# Patient Record
Sex: MALE | Race: WHITE | NOT HISPANIC OR LATINO | URBAN - METROPOLITAN AREA
[De-identification: names, ages, dates, MRNs, and addresses within clinical notes are randomized per-mention and may not be internally consistent; named-entity substitution may affect disease eponyms.]

---

## 2017-06-19 ENCOUNTER — OFFICE (OUTPATIENT)
Dept: URBAN - METROPOLITAN AREA CLINIC 75 | Facility: CLINIC | Age: 27
End: 2017-06-19

## 2017-06-19 VITALS
HEART RATE: 51 BPM | WEIGHT: 211 LBS | DIASTOLIC BLOOD PRESSURE: 70 MMHG | SYSTOLIC BLOOD PRESSURE: 134 MMHG | HEIGHT: 75 IN

## 2017-06-19 DIAGNOSIS — K51.50 LEFT SIDED COLITIS WITHOUT COMPLICATIONS: ICD-10-CM

## 2017-06-19 PROCEDURE — 99214 OFFICE O/P EST MOD 30 MIN: CPT | Performed by: INTERNAL MEDICINE

## 2017-11-10 ENCOUNTER — OFFICE (OUTPATIENT)
Dept: URBAN - METROPOLITAN AREA CLINIC 75 | Facility: CLINIC | Age: 27
End: 2017-11-10

## 2017-11-10 VITALS
DIASTOLIC BLOOD PRESSURE: 78 MMHG | SYSTOLIC BLOOD PRESSURE: 142 MMHG | WEIGHT: 213 LBS | HEIGHT: 75 IN | HEART RATE: 68 BPM

## 2017-11-10 DIAGNOSIS — K51.50 LEFT SIDED COLITIS WITHOUT COMPLICATIONS: ICD-10-CM

## 2017-11-10 DIAGNOSIS — R19.7 DIARRHEA, UNSPECIFIED: ICD-10-CM

## 2017-11-10 PROCEDURE — 99214 OFFICE O/P EST MOD 30 MIN: CPT | Performed by: INTERNAL MEDICINE

## 2017-11-10 RX ORDER — PREDNISONE 10 MG/1
TABLET ORAL
Qty: 100 | Refills: 1 | Status: ACTIVE

## 2017-11-10 RX ORDER — MESALAMINE 1000 MG/1
SUPPOSITORY RECTAL
Qty: 60 | Refills: 6 | Status: ACTIVE

## 2018-09-11 ENCOUNTER — APPOINTMENT (OUTPATIENT)
Dept: LAB | Age: 28
End: 2018-09-11
Attending: FAMILY MEDICINE
Payer: COMMERCIAL

## 2018-09-11 ENCOUNTER — OFFICE VISIT (OUTPATIENT)
Dept: FAMILY MEDICINE CLINIC | Facility: CLINIC | Age: 28
End: 2018-09-11
Payer: COMMERCIAL

## 2018-09-11 VITALS
WEIGHT: 214 LBS | DIASTOLIC BLOOD PRESSURE: 75 MMHG | TEMPERATURE: 99 F | HEART RATE: 76 BPM | HEIGHT: 75 IN | BODY MASS INDEX: 26.61 KG/M2 | RESPIRATION RATE: 18 BRPM | SYSTOLIC BLOOD PRESSURE: 124 MMHG

## 2018-09-11 DIAGNOSIS — L65.9 HAIR LOSS: ICD-10-CM

## 2018-09-11 DIAGNOSIS — L65.9 HAIR LOSS: Primary | ICD-10-CM

## 2018-09-11 DIAGNOSIS — R61 EXCESSIVE SWEATING: ICD-10-CM

## 2018-09-11 LAB
T3 SERPL-MCNC: 1.2 NG/ML (ref 0.87–1.78)
T4 SERPL-MCNC: 10.1 MCG/DL (ref 6.1–12.2)
TSH SERPL-ACNC: 1.47 UIU/ML (ref 0.45–5.33)

## 2018-09-11 PROCEDURE — 36415 COLL VENOUS BLD VENIPUNCTURE: CPT

## 2018-09-11 PROCEDURE — 84436 ASSAY OF TOTAL THYROXINE: CPT

## 2018-09-11 PROCEDURE — 84443 ASSAY THYROID STIM HORMONE: CPT

## 2018-09-11 PROCEDURE — 99202 OFFICE O/P NEW SF 15 MIN: CPT | Performed by: FAMILY MEDICINE

## 2018-09-11 PROCEDURE — 99212 OFFICE O/P EST SF 10 MIN: CPT | Performed by: FAMILY MEDICINE

## 2018-09-11 PROCEDURE — 84480 ASSAY TRIIODOTHYRONINE (T3): CPT

## 2018-09-11 RX ORDER — MESALAMINE 1000 MG/1
SUPPOSITORY RECTAL
Refills: 4 | COMMUNITY
Start: 2018-08-22 | End: 2018-11-07

## 2018-09-11 RX ORDER — PREDNISONE 10 MG/1
TABLET ORAL
Refills: 0 | COMMUNITY
Start: 2018-08-15 | End: 2018-11-07

## 2018-09-11 NOTE — PROGRESS NOTES
HPI:    Patient ID: Braden Browne is a 29year old male. Pt presents with concerns about his thyroid. Pt has had increased sweating, hair loss over the last few months. No hx of thyroid problems.    Pt has hx of ulcerative colitis and is seeing a GI an GI for his ulcerative colitis.       Orders Placed This Encounter      TSH [E]      Triiodothyronine (T3) Total [E]      Thyroxine (T4) Total [E]      Meds This Visit:  Requested Prescriptions      No prescriptions requested or ordered in this encounter

## 2018-11-07 ENCOUNTER — OFFICE VISIT (OUTPATIENT)
Dept: GASTROENTEROLOGY | Facility: CLINIC | Age: 28
End: 2018-11-07
Payer: COMMERCIAL

## 2018-11-07 ENCOUNTER — TELEPHONE (OUTPATIENT)
Dept: GASTROENTEROLOGY | Facility: CLINIC | Age: 28
End: 2018-11-07

## 2018-11-07 VITALS
DIASTOLIC BLOOD PRESSURE: 75 MMHG | WEIGHT: 220 LBS | SYSTOLIC BLOOD PRESSURE: 123 MMHG | HEIGHT: 75 IN | HEART RATE: 66 BPM | BODY MASS INDEX: 27.35 KG/M2

## 2018-11-07 DIAGNOSIS — K51.20 ULCERATIVE PROCTITIS WITHOUT COMPLICATION (HCC): Primary | ICD-10-CM

## 2018-11-07 PROCEDURE — 99243 OFF/OP CNSLTJ NEW/EST LOW 30: CPT | Performed by: INTERNAL MEDICINE

## 2018-11-07 PROCEDURE — 99212 OFFICE O/P EST SF 10 MIN: CPT | Performed by: INTERNAL MEDICINE

## 2018-11-07 NOTE — PATIENT INSTRUCTIONS
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

## 2018-11-07 NOTE — TELEPHONE ENCOUNTER
I called Dr Veto Cisneros office and spoke to [facility] and informed her that I was looking for colonoscopy records from 8/2013, she stated that Dr Tracy Vargas did not perform that colonoscopy and therefore could not release those records.    However she did give the

## 2018-11-07 NOTE — PROGRESS NOTES
Burgess Panda is a 29year old male. HPI:   Patient presents with:  GI Bleeding (gastrointestinal)    The patient is a 44-year-old male who was diagnosed with ulcerative proctitis 5 years ago.   He reports he has been under good control with Canasa sup file.    Allergies:    Sulfa Antibiotics       UNKNOWN      ROS:   The patient denies any chest pain or shortness of breath,  No neurologic or dermatologic symptoms.      PHYSICAL EXAM:   Blood pressure 123/75, pulse 66, height 6' 3\" (1.905 m), weight 220

## 2018-12-28 ENCOUNTER — TELEPHONE (OUTPATIENT)
Dept: GASTROENTEROLOGY | Facility: CLINIC | Age: 28
End: 2018-12-28

## 2018-12-28 NOTE — TELEPHONE ENCOUNTER
Overdue labs letter mailed to pt.     Labs: Sed Rate, Immunoglobulin, Tissue Transglutaminase, C-Reactive Protein, Comp Metabolic Panel, CBC

## 2018-12-31 NOTE — TELEPHONE ENCOUNTER
Pt contacted and reminded he was to follow up in 2-3 months per 's Nov OV. Accepted appt with Dr Jalen Almazan Feb 14, instructed to arrive by 2pm and given directions to [facility]. He will also get overdue labs done.

## 2019-11-07 ENCOUNTER — TELEPHONE (OUTPATIENT)
Dept: GASTROENTEROLOGY | Facility: CLINIC | Age: 29
End: 2019-11-07

## 2019-11-07 DIAGNOSIS — K51.219 ULCERATIVE PROCTITIS WITH COMPLICATION (HCC): Primary | ICD-10-CM

## 2019-11-07 RX ORDER — MESALAMINE 1000 MG/1
1000 SUPPOSITORY RECTAL NIGHTLY
Qty: 30 SUPPOSITORY | Refills: 2 | Status: SHIPPED | OUTPATIENT
Start: 2019-11-07 | End: 2020-01-21

## 2019-11-07 NOTE — TELEPHONE ENCOUNTER
Dr. Paz Payne,     Pt had pending lab orders from 18, they were not done. Pt now requesting for them to be renewed as they  today. I pended these labs for your review and signature. Pt is also asking a refill on canasa suppository.   Per Melba Cedeño

## 2019-11-07 NOTE — TELEPHONE ENCOUNTER
Patient requesting new orders for labs expiring today. Patient scheduled appointment on 1/15/19, please call at:[phone],thanks.

## 2019-11-07 NOTE — TELEPHONE ENCOUNTER
Patient requesting script for rx:Canasa or generic Mesalamini Suppostory 1000 MG, one per day prescribed by previously gi in [facility].  Please call at:  Patient has 2 weeks lefts and needs prior to appointment schedule 1/15/19 with , please call at:2

## 2019-11-07 NOTE — TELEPHONE ENCOUNTER
Tamiko Gill 1 hour ago (12:01 PM)         Patient requesting script for rx:Canasa or generic Mesalamini Suppostory 1000 MG, one per day prescribed by previously gi in [facility].  Please call at:  Patient has 2 weeks lefts and needs prior to appointment sche

## 2019-11-08 NOTE — TELEPHONE ENCOUNTER
Patient contacted and informed him about the refill and the labs being renewed. Requested that I send over the lab orders to his home address since he's going to have them down outside the hospital due to coverage issues.      Address confirmed and mailed

## 2020-01-21 RX ORDER — MESALAMINE 1000 MG/1
SUPPOSITORY RECTAL
Qty: 90 SUPPOSITORY | Refills: 0 | Status: SHIPPED | OUTPATIENT
Start: 2020-01-21 | End: 2020-02-20

## 2020-01-26 LAB
ABSOLUTE BASOPHILS: 11 CELLS/UL (ref 0–200)
ABSOLUTE EOSINOPHILS: 29 CELLS/UL (ref 15–500)
ABSOLUTE LYMPHOCYTES: 1199 CELLS/UL (ref 850–3900)
ABSOLUTE MONOCYTES: 346 CELLS/UL (ref 200–950)
ABSOLUTE NEUTROPHILS: 2016 CELLS/UL (ref 1500–7800)
ALBUMIN/GLOBULIN RATIO: 2 (CALC) (ref 1–2.5)
ALBUMIN: 4.9 G/DL (ref 3.6–5.1)
ALKALINE PHOSPHATASE: 41 U/L (ref 40–115)
ALT: 22 U/L (ref 9–46)
AST: 21 U/L (ref 10–40)
BASOPHILS: 0.3 %
BILIRUBIN, TOTAL: 0.5 MG/DL (ref 0.2–1.2)
BUN: 13 MG/DL (ref 7–25)
C-REACTIVE PROTEIN: 1.2 MG/L
CALCIUM: 9.9 MG/DL (ref 8.6–10.3)
CARBON DIOXIDE: 26 MMOL/L (ref 20–32)
CHLORIDE: 101 MMOL/L (ref 98–110)
CREATININE: 1.03 MG/DL (ref 0.6–1.35)
EGFR IF AFRICN AM: 113 ML/MIN/1.73M2
EGFR IF NONAFRICN AM: 98 ML/MIN/1.73M2
EOSINOPHILS: 0.8 %
GLOBULIN: 2.4 G/DL (CALC) (ref 1.9–3.7)
GLUCOSE: 87 MG/DL (ref 65–99)
HEMATOCRIT: 40 % (ref 38.5–50)
HEMOGLOBIN: 14.3 G/DL (ref 13.2–17.1)
IMMUNOGLOBULIN A: 185 MG/DL (ref 47–310)
LYMPHOCYTES: 33.3 %
MCH: 29.7 PG (ref 27–33)
MCHC: 35.8 G/DL (ref 32–36)
MCV: 83 FL (ref 80–100)
MONOCYTES: 9.6 %
MPV: 10.8 FL (ref 7.5–12.5)
NEUTROPHILS: 56 %
PLATELET COUNT: 230 THOUSAND/UL (ref 140–400)
POTASSIUM: 3.9 MMOL/L (ref 3.5–5.3)
PROTEIN, TOTAL: 7.3 G/DL (ref 6.1–8.1)
RDW: 12.8 % (ref 11–15)
RED BLOOD CELL COUNT: 4.82 MILLION/UL (ref 4.2–5.8)
SED RATE BY MODIFIED$WESTERGREN: 6 MM/H
SODIUM: 138 MMOL/L (ref 135–146)
TISSUE TRANSGLUTAMINASE$ANTIBODY, IGA: 1 U/ML
WHITE BLOOD CELL COUNT: 3.6 THOUSAND/UL (ref 3.8–10.8)

## 2020-02-20 ENCOUNTER — TELEPHONE (OUTPATIENT)
Dept: GASTROENTEROLOGY | Facility: CLINIC | Age: 30
End: 2020-02-20

## 2020-02-20 ENCOUNTER — OFFICE VISIT (OUTPATIENT)
Dept: GASTROENTEROLOGY | Facility: CLINIC | Age: 30
End: 2020-02-20
Payer: COMMERCIAL

## 2020-02-20 VITALS
SYSTOLIC BLOOD PRESSURE: 124 MMHG | WEIGHT: 214.19 LBS | HEIGHT: 75 IN | DIASTOLIC BLOOD PRESSURE: 77 MMHG | BODY MASS INDEX: 26.63 KG/M2 | HEART RATE: 60 BPM

## 2020-02-20 DIAGNOSIS — K51.219 ULCERATIVE PROCTITIS WITH COMPLICATION (HCC): Primary | ICD-10-CM

## 2020-02-20 PROCEDURE — 99212 OFFICE O/P EST SF 10 MIN: CPT | Performed by: INTERNAL MEDICINE

## 2020-02-20 NOTE — PATIENT INSTRUCTIONS
Ulcerative proctitis  - continue on canasa suppository   - lab work every 6 months  - colonoscopy + colyte prep and MAC  - see me annually

## 2020-02-20 NOTE — TELEPHONE ENCOUNTER
Scheduled for:  Colonoscopy 97895  Provider Name:  Dr Willy Giron  Date:  05/15/2020  Location:  [facility]  Sedation:  MAC  Time:  0730 (pt is aware to arrive at 0630)  Prep:  Colyte  Meds/Allergies Reconciled?:  Physician reviewed  Diagnosis with codes:  Ulcerative P

## 2020-02-21 RX ORDER — MESALAMINE 1000 MG/1
1000 SUPPOSITORY RECTAL NIGHTLY
Qty: 90 SUPPOSITORY | Refills: 3 | Status: SHIPPED | OUTPATIENT
Start: 2020-02-21 | End: 2020-04-03

## 2020-03-02 NOTE — PROGRESS NOTES
Hoy Fleischer is a 27year old male. HPI:   Patient presents with: Follow - Up: no new complaints, needs refill on medication      The patient is a 80-year-old male who has a history of ulcerative proctitis.   He has been doing well on therapy and whi Canasa suppositories which he has been taking on a fairly regular basis. We discussed the risks including risk of progression of disease to involve more the colon such as ulcerative colitis.   The potential for more aggressive therapy was also discussed wi

## 2020-04-02 NOTE — TELEPHONE ENCOUNTER
Current Outpatient Medications   Medication Sig Dispense Refill   • mesalamine (CANASA) 1000 MG Rectal Suppos Place 1 suppository (1,000 mg total) rectally nightly.  90 suppository 3       Requesting cheaper alternative

## 2020-04-02 NOTE — TELEPHONE ENCOUNTER
Dr. Chandu Farfan    Is there an cheaper alternative to mesalamine 1000mg suppository that we could prescribe for patient?       Please advise    Thank you

## 2020-04-03 RX ORDER — MESALAMINE 1000 MG/1
1000 SUPPOSITORY RECTAL NIGHTLY
Qty: 90 SUPPOSITORY | Refills: 3 | Status: SHIPPED | OUTPATIENT
Start: 2020-04-03 | End: 2021-03-04

## 2020-04-03 NOTE — TELEPHONE ENCOUNTER
Patient aware that prescription entered as dispense as written so that he could use his discount card on CANASA.     Thank you

## 2020-04-03 NOTE — TELEPHONE ENCOUNTER
In order for patient to use discount card, he has to get prescription for the brand name [place] only and not the generic. There is only one way to enter this in our electronic system. I spoke with pharmacist Charles Johnson at [facility] in [employer] a [address].   Per aníbal

## 2020-04-03 NOTE — TELEPHONE ENCOUNTER
Patient indicates he has a discount card, but in order for him to use he needs script for rx:Canasa. Patient indicates the generic would be more expensive. Please call at:[phone],thanks.     Current Outpatient Medications:   •  mesalamine (CANASA) 1000

## 2020-05-14 ENCOUNTER — TELEPHONE (OUTPATIENT)
Dept: GASTROENTEROLOGY | Facility: CLINIC | Age: 30
End: 2020-05-14

## 2020-05-14 NOTE — TELEPHONE ENCOUNTER
I spoke with this patient to reschedule his procedure but he decided to hold off for now since he is feeling much better. He will CB if his issues return.

## 2020-11-02 ENCOUNTER — APPOINTMENT (OUTPATIENT)
Dept: LAB | Age: 30
End: 2020-11-02
Attending: PHYSICIAN ASSISTANT
Payer: COMMERCIAL

## 2020-11-02 ENCOUNTER — TELEMEDICINE (OUTPATIENT)
Dept: FAMILY MEDICINE CLINIC | Facility: CLINIC | Age: 30
End: 2020-11-02

## 2020-11-02 VITALS — TEMPERATURE: 100 F

## 2020-11-02 DIAGNOSIS — Z20.822 SUSPECTED 2019 NOVEL CORONAVIRUS INFECTION: ICD-10-CM

## 2020-11-02 DIAGNOSIS — Z20.822 SUSPECTED 2019 NOVEL CORONAVIRUS INFECTION: Primary | ICD-10-CM

## 2020-11-02 PROCEDURE — 99213 OFFICE O/P EST LOW 20 MIN: CPT | Performed by: PHYSICIAN ASSISTANT

## 2020-11-02 NOTE — PROGRESS NOTES
Please note that the following visit was completed using two-way, real-time interactive audio and/or video communication.   This has been done in good cindy to provide continuity of care in the best interest of the provider-patient relationship, due to the Antibiotics       UNKNOWN       Current Outpatient Medications:   •  CANASA 1000 MG Rectal Suppos, Place 1 suppository (1,000 mg total) rectally nightly., Disp: 90 suppository, Rfl: 3    History reviewed. No pertinent past medical history.       PHYSICAL EX SUMAN  11/2/2020  10:30 AM    #[id number]

## 2020-11-05 ENCOUNTER — TELEPHONE (OUTPATIENT)
Dept: FAMILY MEDICINE CLINIC | Facility: CLINIC | Age: 30
End: 2020-11-05

## 2020-11-05 NOTE — TELEPHONE ENCOUNTER
Patient stated that he was symptomatic but still awaiting his Covid test however wondering since his wife is positive should they be quarantining from each other. Educated patient on CDC guidelines and advised patient that if he is positive that they will both have to quarantine away from the general public. If symptoms worsen with shortness of breath or uncontrollable fevers to seek ER. Patient agreed.

## 2021-03-04 RX ORDER — MESALAMINE 1000 MG/1
1000 SUPPOSITORY RECTAL NIGHTLY
Qty: 90 SUPPOSITORY | Refills: 3 | Status: SHIPPED | OUTPATIENT
Start: 2021-03-04

## 2021-03-04 NOTE — TELEPHONE ENCOUNTER
Requested Prescriptions     Pending Prescriptions Disp Refills   • CANASA 1000 MG Rectal Suppos [Pharmacy Med Name: CANASA 1,000 MG SUPPOSITORY] 90 suppository 3     Sig: PLACE 1 SUPPOSITORY (1,000 MG TOTAL) RECTALLY NIGHTLY.      LOV  2/20/2020  LR  4/03/2
normal...

## 2022-03-09 ENCOUNTER — TELEPHONE (OUTPATIENT)
Dept: GASTROENTEROLOGY | Facility: CLINIC | Age: 32
End: 2022-03-09

## 2022-03-09 RX ORDER — MESALAMINE 1000 MG/1
1000 SUPPOSITORY RECTAL NIGHTLY
Qty: 90 SUPPOSITORY | Refills: 0 | Status: SHIPPED | OUTPATIENT
Start: 2022-03-09 | End: 2022-03-30

## 2022-03-10 NOTE — TELEPHONE ENCOUNTER
Patient read Intell[employer] message about setting up appointment for future refills:  Last read by Tenzin Cagle at 7:46 AM on 3/10/2022.

## 2022-03-11 NOTE — TELEPHONE ENCOUNTER
Fax received for [facility] that Canasa suppositories has been denied because it is not on their list of preferred drugs. I called [facility].  They told me he should still have a month supply left. He has two plans per [address] and it goes through for a $137 copay which he has been paying/picking up with.

## 2022-03-16 NOTE — TELEPHONE ENCOUNTER
Dr. Frank Zhou    Patient's Canasa suppositories have been denied. Would you be willing to do a peer to peer? Information on how to proceed in below message.     Thank you

## 2022-03-16 NOTE — TELEPHONE ENCOUNTER
Dean/Jerad states that this office can do a peer to peer for this medication if necessary. Please call Dr. Samantha Dodd at [phone] to complete.     Ref # is [id number]

## 2022-03-18 NOTE — TELEPHONE ENCOUNTER
Attempted to contact Dr. Rudy Fountain voicemail said he is out of the office until Wednesday, March 23.     RN to make sure the pt has enough medication - will attempt to contact the doctor for approval when he is back next week

## 2022-03-18 NOTE — TELEPHONE ENCOUNTER
Patient contacted. He still has a box left which is a 30 day supply. I told him I would update him once peer to peer done.

## 2022-03-30 RX ORDER — MESALAMINE 1000 MG/1
SUPPOSITORY RECTAL
Qty: 90 SUPPOSITORY | Refills: 3 | Status: SHIPPED | OUTPATIENT
Start: 2022-03-30

## 2022-04-01 NOTE — TELEPHONE ENCOUNTER
Dr. Ayah Hamilton    Patient called his insurance and then called us to follow up. Are you able to assist with peer to peer so he can fill the [address] by calling Dr. Kim Shankar at [phone] Ref # [id number]? I let him know that Dr. Janae Brian was out of office until 3/23 then you were out of office this week.     Thank you

## 2022-04-07 ENCOUNTER — PATIENT MESSAGE (OUTPATIENT)
Dept: GASTROENTEROLOGY | Facility: CLINIC | Age: 32
End: 2022-04-07

## 2022-04-07 NOTE — TELEPHONE ENCOUNTER
From: Vipul Harris  To: Daryl Crooks. Paige Meza MD  Sent: 4/7/2022 11:20 AM CDT  Subject: Appeal with [employer]    I've phoned [employer] to request an expedited appeal of their denial of my Mesalamine 1000mg Suppository. They require more information from your office. If I hear back from them I will follow up with any additional requests. Could you please continue to pursue this issue on your end? I currently have a 2-week supply of medication and am not sure how to proceed if this case is not resolved before I run out of suppositories.     Thanks,    Danisha Thomas

## 2022-04-07 NOTE — TELEPHONE ENCOUNTER
From: Ehsan Chin  To: Ade Shepard. Alex Cole MD  Sent: 4/7/2022 11:29 AM CDT  Subject: [employer] Appeal    If it improves my odds of coverage I'm open to the generic Mesalamine 1000mg suppository. I'm not comfortable with the oral tablet / systemic treatment if it is not required. I'm also not keen on the enema. I'd rather go unmedicated and see what happens rather than deal with that every night.

## 2022-04-07 NOTE — TELEPHONE ENCOUNTER
Dr. Jeniffer Hsu    Please see below message from patient.     I already routed TE today about appeal and if you wanted to write a letter to support case     Thank you

## 2022-04-08 NOTE — TELEPHONE ENCOUNTER
See TE dated 3/9/2022 for further documentation. I already faxed letter and clinicals to support need for medication to [facility] as expedited. Await response which should be within 72 hours.

## 2022-07-15 ENCOUNTER — TELEPHONE (OUTPATIENT)
Dept: GASTROENTEROLOGY | Facility: CLINIC | Age: 32
End: 2022-07-15

## 2022-07-15 RX ORDER — MESALAMINE 1000 MG/1
1000 SUPPOSITORY RECTAL NIGHTLY
Qty: 90 SUPPOSITORY | Refills: 3 | Status: SHIPPED | OUTPATIENT
Start: 2022-07-15

## 2022-07-15 NOTE — TELEPHONE ENCOUNTER
See TE 2/20/2020 and TE 5/14/2020     RN's  I contacted patient and about Rx refill and follow-up appointment.  Patient agreed to schedule CLN procedure before his scheduled follow-up appointment with Dr Dora Fair on 10/19/2022 @ [address] [place] [address] 1 @ 10:30 am  Please route schedulers if appropiate    Cancelled for:  Colonoscopy 34731  Provider Name:  Dr Dora Fair  Date:  05/15/2020  Location:  [facility]  Sedation:  MAC  Time:  0730 (pt is aware to arrive at 0630)  Prep:  Colyte  Meds/Allergies Reconciled?:  Physician reviewed  Diagnosis with codes:  Ulcerative Proctitis K51.219

## 2022-07-15 NOTE — TELEPHONE ENCOUNTER
Dr. Hamilton Fitzgerald (on call provider)    Patient requesting refill of mesalamine suppositories. Not on protocol list for direct RN approval.    Please sign off on refill if appropriate. Has office visit scheduled in October.     Thank you    Your Appointments           Wednesday October 19, 2022 10:30 AM  (Arrive by 10:20 AM)  Follow Up Visit with Izabela Meza MD  [facility], [facility], [address] [place] [address],[address], [place] [facility] [address] [facility]  [phone]

## 2022-07-15 NOTE — TELEPHONE ENCOUNTER
Dr. Mikayla Calle    Patient has an office appointment scheduled for 10/19/2022. Can he schedule his colonoscopy now since we are booking out or does he need to be seen in office first?    If he can schedule now, could you please provide orders?     Thank you

## 2022-07-19 NOTE — TELEPHONE ENCOUNTER
Ok to schedule the colonoscopy directly but he needs to follow up in the office as well.    Dx- ulcerative proctitis  Miralax prep  MAC sedation

## 2022-10-19 ENCOUNTER — OFFICE VISIT (OUTPATIENT)
Dept: GASTROENTEROLOGY | Facility: CLINIC | Age: 32
End: 2022-10-19
Payer: COMMERCIAL

## 2022-10-19 ENCOUNTER — TELEPHONE (OUTPATIENT)
Dept: GASTROENTEROLOGY | Facility: CLINIC | Age: 32
End: 2022-10-19

## 2022-10-19 VITALS
DIASTOLIC BLOOD PRESSURE: 70 MMHG | HEIGHT: 75 IN | BODY MASS INDEX: 27.23 KG/M2 | WEIGHT: 219 LBS | SYSTOLIC BLOOD PRESSURE: 120 MMHG

## 2022-10-19 DIAGNOSIS — K51.20 ULCERATIVE PROCTITIS WITHOUT COMPLICATION (HCC): Primary | ICD-10-CM

## 2022-10-19 PROCEDURE — 3008F BODY MASS INDEX DOCD: CPT | Performed by: INTERNAL MEDICINE

## 2022-10-19 PROCEDURE — 3074F SYST BP LT 130 MM HG: CPT | Performed by: INTERNAL MEDICINE

## 2022-10-19 PROCEDURE — 3078F DIAST BP <80 MM HG: CPT | Performed by: INTERNAL MEDICINE

## 2022-10-19 PROCEDURE — 99213 OFFICE O/P EST LOW 20 MIN: CPT | Performed by: INTERNAL MEDICINE

## 2022-10-19 NOTE — PATIENT INSTRUCTIONS
Ulcerative proctitis/colitis  - continue on canasa   - lab work today   - colonoscopy with Miralax and MAC   - make sure up to date on vaccines.

## 2022-10-19 NOTE — TELEPHONE ENCOUNTER
Patient was given prep sheets for both Golytely/Trilyte/Colyte and Miralax/Gatorade. Dr. Jeniffer Hsu ordered Miralax but patient stated he had the gallon prep before and it worked very well. He wants to talk with his wife who is a pharmacist and will call our office if he would like the prescription Golytely/Trilyte/Colyte. Scheduled for:  Colonoscopy 52724  Provider Name:  Dr. Jeniffer Hsu  Date:  01/06/2023 - per pt request  Location:  [facility]  Sedation:  MAC  Time:  9:45 am, arrival 8:45 am  Prep:  See above  Meds/Allergies Reconciled?: Physician reviewed  Diagnosis with codes:  Ulcerative proctitis K51.20  Was patient informed to call insurance with codes (Y/N):  Yes  Referral sent?:  Referral was sent at the time of electronic surgical scheduling. [facility] or [address] notified?:  I sent an electronic request to Endo Scheduling and received a confirmation today. Medication Orders:  N/A  Misc Orders:  N/A     Further instructions given by staff:  I provided patient with both prep instruction sheets (see above).

## 2022-12-06 NOTE — TELEPHONE ENCOUNTER
Patient is scheduled for CLN on 1/06/2023 @ [address] [place] [address] 1 and was seen in office 10/19/2022.  Closing TE

## 2022-12-30 NOTE — PAT NURSING NOTE
Patient stated him and daughter were having sinus infection symptoms, since procedure is more than 1 week away, instead of canceling per anesthesia recommendation pt advised to get rapid day prior, agreed instead of canceling. covid test scheduled

## 2023-01-05 ENCOUNTER — LAB ENCOUNTER (OUTPATIENT)
Dept: LAB | Facility: HOSPITAL | Age: 33
End: 2023-01-05
Attending: INTERNAL MEDICINE
Payer: COMMERCIAL

## 2023-01-05 DIAGNOSIS — Z01.818 PRE-OP TESTING: ICD-10-CM

## 2023-01-05 LAB — SARS-COV-2 RNA RESP QL NAA+PROBE: NOT DETECTED

## 2023-01-06 ENCOUNTER — HOSPITAL ENCOUNTER (OUTPATIENT)
Age: 33
Setting detail: HOSPITAL OUTPATIENT SURGERY
Discharge: HOME | End: 2023-01-06
Attending: INTERNAL MEDICINE | Admitting: INTERNAL MEDICINE
Payer: COMMERCIAL

## 2023-01-06 ENCOUNTER — ANESTHESIA (OUTPATIENT)
Dept: ENDOSCOPY | Age: 33
End: 2023-01-06
Payer: COMMERCIAL

## 2023-01-06 ENCOUNTER — ANESTHESIA EVENT (OUTPATIENT)
Dept: ENDOSCOPY | Age: 33
End: 2023-01-06
Payer: COMMERCIAL

## 2023-01-06 VITALS
HEIGHT: 75 IN | SYSTOLIC BLOOD PRESSURE: 100 MMHG | OXYGEN SATURATION: 97 % | DIASTOLIC BLOOD PRESSURE: 69 MMHG | TEMPERATURE: 98 F | BODY MASS INDEX: 27.23 KG/M2 | HEART RATE: 67 BPM | RESPIRATION RATE: 13 BRPM | WEIGHT: 219 LBS

## 2023-01-06 DIAGNOSIS — Z01.818 PRE-OP TESTING: Primary | ICD-10-CM

## 2023-01-06 DIAGNOSIS — K51.20 ULCERATIVE PROCTITIS WITHOUT COMPLICATION (HCC): ICD-10-CM

## 2023-01-06 PROCEDURE — 45380 COLONOSCOPY AND BIOPSY: CPT | Performed by: INTERNAL MEDICINE

## 2023-01-06 PROCEDURE — 88305 TISSUE EXAM BY PATHOLOGIST: CPT | Performed by: INTERNAL MEDICINE

## 2023-01-06 PROCEDURE — 45385 COLONOSCOPY W/LESION REMOVAL: CPT | Performed by: INTERNAL MEDICINE

## 2023-01-06 PROCEDURE — 99070 SPECIAL SUPPLIES PHYS/QHP: CPT | Performed by: INTERNAL MEDICINE

## 2023-01-06 RX ORDER — SODIUM CHLORIDE, SODIUM LACTATE, POTASSIUM CHLORIDE, CALCIUM CHLORIDE 600; 310; 30; 20 MG/100ML; MG/100ML; MG/100ML; MG/100ML
INJECTION, SOLUTION INTRAVENOUS CONTINUOUS PRN
Status: DISCONTINUED | OUTPATIENT
Start: 2023-01-06 | End: 2023-01-06 | Stop reason: SURG

## 2023-01-06 RX ADMIN — SODIUM CHLORIDE, SODIUM LACTATE, POTASSIUM CHLORIDE, CALCIUM CHLORIDE: 600; 310; 30; 20 INJECTION, SOLUTION INTRAVENOUS at 09:45:00

## 2023-01-06 RX ADMIN — SODIUM CHLORIDE, SODIUM LACTATE, POTASSIUM CHLORIDE, CALCIUM CHLORIDE: 600; 310; 30; 20 INJECTION, SOLUTION INTRAVENOUS at 09:26:00

## 2023-01-06 NOTE — OPERATIVE REPORT
[facility] Endoscopy Report      Preoperative Diagnosis:  - history of ulcerative proctitis       Postoperative Diagnosis:  - no colitis /proctitis  - colon polyp x1  - early diverticulosis   - small internal hemorrhoids      Procedure:    Colonoscopy      Surgeon:  Ana Murdock M.D. Anesthesia:  MAC     Technique:  After informed consent, the patient was placed in the left lateral recumbent position. Digital rectal examination revealed no palpable intraluminal abnormalities. An Olympus variable stiffness 190 series HD colonoscope was inserted into the rectum and advanced under direct vision by following the lumen to the cecum/TI. The colon was examined upon withdrawal in the left lateral position. The procedures were well tolerated without immediate complication. Findings:  The preparation of the colon was good. The terminal ileum was examined for 4 cm and visually normal.  The ileocecal valve was well preserved. The visualized colonic mucosa from the cecum to the anal verge was normal with an intact vascular pattern. Colon polyp x1 removed from the base of the cecum, this was sessile approximately 4 mm in size and cold snare removed. No bleeding was noted from the polypectomy site and specimens retrieved and sent for analysis. Random samples taken from overall normal-appearing colonic mucosa, no evidence of colitis or proctitis. Biopsies taken from right colon, left colon and rectum and labeled in separate containers. Dimpling of the colon wall in the sigmoid and distal descending colon consistent with early diverticulosis. No diverticulitis. Small internal hemorrhoids noted on retroflexed view.     Estimated blood loss-insignificant  Specimens-see above      Impression:  - no colitis /proctitis  - colon polyp x1  - early diverticulosis   - small internal hemorrhoids    Recommendations:  - Post polypectomy instructions given  - Repeat colonoscopy in 2 years  - High fiber diet for diverticular disease  - Symptomatic treatment of hemorrhoids          Amy Osorio.  Ayah Hamilton MD  1/6/2023  9:48 AM

## 2023-01-06 NOTE — DISCHARGE INSTRUCTIONS
Home Care Instructions for Colonoscopy and/or Gastroscopy with Sedation    Diet:  - Resume your regular diet as tolerated unless otherwise instructed. - Start with light meals to minimize bloating.  - Do not drink alcohol today. Medication:  - If you have questions about resuming your normal medications, please contact your Primary Care Physician. Activities:  - Take it easy today. Do not return to work today. - Do not drive today. - Do not operate any machinery today (including kitchen equipment). Colonoscopy:  - You may notice some rectal \"spotting\" (a little blood on the toilet tissue) for a day or two after the exam. This is normal.  - If you experience any rectal bleeding (not spotting), persistent tenderness or sharp severe abdominal pains, oral temperature over 100 degrees Fahrenheit, light-headedness or dizziness, or any other problems, contact your doctor. **If unable to reach your doctor, please go to the [facility] Emergency Room**    - Your referring physician will receive a full report of your examination.  - If you do not hear from your doctor's office within two weeks of your biopsy, please call them for your results. You may be able to see your laboratory results in [facility] between 4 and 7 business days. In some cases, your physician may not have viewed the results before they are released to [address] If you have questions regarding your results contact the physician who ordered the test/exam by phone or via [address] by choosing \"Ask a Medical Question. \"

## 2023-01-12 ENCOUNTER — TELEPHONE (OUTPATIENT)
Facility: CLINIC | Age: 33
End: 2023-01-12

## 2023-01-12 NOTE — TELEPHONE ENCOUNTER
----- Message from Christelle Skaggs MD sent at 1/9/2023  5:08 PM CST -----  I wanted to get back to you with your colonoscopy results. You had one colon polyp removed which was benign. I would advise a repeat colonoscopy in 2 years to check for new polyps and monitor for colitis. No evidence of active colitis. You do have internal hemorrhoids and diverticulosis. Please call with any questions.

## 2023-01-12 NOTE — TELEPHONE ENCOUNTER
Health Maintenance updated. 2 year colonoscopy recall entered into patient outreach in [facility]. Next colonoscopy will be due 1/6/2025    Result letter generated, released to [address], and mailed to patient's home address.

## 2023-02-27 ENCOUNTER — TELEPHONE (OUTPATIENT)
Facility: CLINIC | Age: 33
End: 2023-02-27

## 2023-02-27 NOTE — TELEPHONE ENCOUNTER
1st reminder letter sent out via mail and [employer] for the following:   COMP METABOLIC PANEL (14) (Order #[id number]) on 10/19/22  CBC WITH DIFFERENTIAL WITH PLATELET (Order #[id number]) on 10/19/22

## 2023-06-28 RX ORDER — MESALAMINE 1000 MG/1
1000 SUPPOSITORY RECTAL NIGHTLY
Qty: 90 SUPPOSITORY | Refills: 3 | Status: SHIPPED | OUTPATIENT
Start: 2023-06-28

## 2023-11-09 ENCOUNTER — OFFICE VISIT (OUTPATIENT)
Dept: FAMILY MEDICINE CLINIC | Facility: CLINIC | Age: 33
End: 2023-11-09
Payer: COMMERCIAL

## 2023-11-09 VITALS
HEART RATE: 60 BPM | TEMPERATURE: 98 F | HEIGHT: 73.3 IN | DIASTOLIC BLOOD PRESSURE: 68 MMHG | WEIGHT: 217 LBS | SYSTOLIC BLOOD PRESSURE: 110 MMHG | BODY MASS INDEX: 28.45 KG/M2 | RESPIRATION RATE: 16 BRPM

## 2023-11-09 DIAGNOSIS — Z87.19 HISTORY OF ULCERATIVE COLITIS: ICD-10-CM

## 2023-11-09 DIAGNOSIS — Z00.00 ROUTINE PHYSICAL EXAMINATION: Primary | ICD-10-CM

## 2023-11-09 PROCEDURE — 3074F SYST BP LT 130 MM HG: CPT | Performed by: FAMILY MEDICINE

## 2023-11-09 PROCEDURE — 3008F BODY MASS INDEX DOCD: CPT | Performed by: FAMILY MEDICINE

## 2023-11-09 PROCEDURE — 99395 PREV VISIT EST AGE 18-39: CPT | Performed by: FAMILY MEDICINE

## 2023-11-09 PROCEDURE — 3078F DIAST BP <80 MM HG: CPT | Performed by: FAMILY MEDICINE

## 2023-11-09 NOTE — PROGRESS NOTES
Subjective:   Patient ID: Arina Anne is a 35year old male. Patient is here for routine physical exam. No acute issues. Patient is requesting testing. Diet and exercise have been fair. Pt has young kids. Past medical history, family history, and social history were reviewed. Past hx of ulcerative colitis and taking medication. Pt is seeing GI and doing well. Had COVID and has had COVID vaccine        History/Other:   Review of Systems   Constitutional: Negative. Negative for fever. HENT: Negative. Eyes: Negative. Respiratory: Negative. Negative for cough and shortness of breath. Cardiovascular: Negative. Negative for chest pain and palpitations. Gastrointestinal: Negative. Negative for abdominal pain and blood in stool. Endocrine: Negative. Genitourinary: Negative. Negative for difficulty urinating and dysuria. Musculoskeletal: Negative. Skin: Negative. Allergic/Immunologic: Negative. Neurological: Negative. Negative for dizziness and headaches. Hematological: Negative. Psychiatric/Behavioral: Negative. Negative for dysphoric mood. The patient is not nervous/anxious. Current Outpatient Medications   Medication Sig Dispense Refill    MESALAMINE 1000 MG Rectal Suppos PLACE 1 SUPPOSITORY (1,000 MG TOTAL) RECTALLY NIGHTLY. 90 suppository 3    Probiotic Product (PROBIOTIC DAILY OR) Take by mouth. [employer] brand       Allergies: Allergies   Allergen Reactions    Sulfa Antibiotics UNKNOWN       Objective:   Physical Exam  Constitutional:       Appearance: Normal appearance. He is well-developed. HENT:      Head: Normocephalic. Right Ear: Tympanic membrane, ear canal and external ear normal.      Left Ear: Tympanic membrane, ear canal and external ear normal.      Nose: Nose normal.      Mouth/Throat:      Mouth: Mucous membranes are moist.      Pharynx: No oropharyngeal exudate or posterior oropharyngeal erythema.    Eyes:      Conjunctiva/sclera: Conjunctivae normal.   Cardiovascular:      Rate and Rhythm: Normal rate and regular rhythm. Pulses: Normal pulses. Heart sounds: Normal heart sounds. Pulmonary:      Effort: Pulmonary effort is normal. No respiratory distress. Breath sounds: Normal breath sounds. No wheezing or rales. Abdominal:      General: Abdomen is flat. There is no distension. Palpations: Abdomen is soft. There is no mass. Tenderness: There is no abdominal tenderness. Musculoskeletal:         General: Normal range of motion. Cervical back: Normal range of motion and neck supple. Skin:     General: Skin is warm. Neurological:      General: No focal deficit present. Mental Status: He is alert and oriented to person, place, and time. Sensory: No sensory deficit. Deep Tendon Reflexes: Reflexes are normal and symmetric. Reflexes normal.   Psychiatric:         Mood and Affect: Mood normal.         Behavior: Behavior normal.         Assessment & Plan:   1. Routine physical examination:  - Exam is unremarkable. Screening tests were discussed, and after discussion, will check lab work as below. Healthy diet, exercise, and weight were discussed. To call if problems and follow up and further management after testing. Routine follow up. Discussed COVID vaccines. 2. History of ulcerative colitis :  - Stable, continue present management and follow up with GI. Orders Placed This Encounter   Procedures    Comp Metabolic Panel (14)    CBC, Platelet;  No Differential    Lipid Panel    Hemoglobin A1C       Meds This Visit:  Requested Prescriptions      No prescriptions requested or ordered in this encounter       Imaging & Referrals:  None

## 2024-06-04 RX ORDER — MESALAMINE 1000 MG/1
1000 SUPPOSITORY RECTAL NIGHTLY
Qty: 90 SUPPOSITORY | Refills: 3 | Status: SHIPPED | OUTPATIENT
Start: 2024-06-04

## 2024-06-04 NOTE — TELEPHONE ENCOUNTER
Refills sent on the suppositories.     RN to have the pt see me in the office within the next 4 - 5 months or so.

## 2024-06-04 NOTE — TELEPHONE ENCOUNTER
Patient called to request 90 day supply of Mesalamine.  Patient needs this sent to [facility].  Please call.          Current Outpatient Medications:     MESALAMINE 1000 MG Rectal Suppos, PLACE 1 SUPPOSITORY (1,000 MG TOTAL) RECTALLY NIGHTLY., Disp: 90 suppository, Rfl: 3

## 2024-06-04 NOTE — TELEPHONE ENCOUNTER
Please advise on refill request below.   Thank you.     LV 10/19/2022    LR 06/28/2023    No future appointment noted in system .

## 2024-09-17 ENCOUNTER — LAB ENCOUNTER (OUTPATIENT)
Dept: LAB | Age: 34
End: 2024-09-17
Attending: FAMILY MEDICINE
Payer: COMMERCIAL

## 2024-09-17 ENCOUNTER — OFFICE VISIT (OUTPATIENT)
Dept: FAMILY MEDICINE CLINIC | Facility: CLINIC | Age: 34
End: 2024-09-17
Payer: COMMERCIAL

## 2024-09-17 VITALS
WEIGHT: 220 LBS | RESPIRATION RATE: 16 BRPM | TEMPERATURE: 98 F | HEIGHT: 74.8 IN | HEART RATE: 72 BPM | SYSTOLIC BLOOD PRESSURE: 126 MMHG | BODY MASS INDEX: 27.64 KG/M2 | DIASTOLIC BLOOD PRESSURE: 72 MMHG

## 2024-09-17 DIAGNOSIS — Z30.09 VASECTOMY EVALUATION: Primary | ICD-10-CM

## 2024-09-17 LAB
ALBUMIN SERPL-MCNC: 5 G/DL (ref 3.2–4.8)
ALBUMIN/GLOB SERPL: 1.6 {RATIO} (ref 1–2)
ALP LIVER SERPL-CCNC: 54 U/L
ALT SERPL-CCNC: 35 U/L
ANION GAP SERPL CALC-SCNC: 8 MMOL/L (ref 0–18)
AST SERPL-CCNC: 22 U/L (ref ?–34)
BILIRUB SERPL-MCNC: 0.6 MG/DL (ref 0.3–1.2)
BUN BLD-MCNC: 15 MG/DL (ref 9–23)
BUN/CREAT SERPL: 14.2 (ref 10–20)
CALCIUM BLD-MCNC: 10.1 MG/DL (ref 8.7–10.4)
CHLORIDE SERPL-SCNC: 104 MMOL/L (ref 98–112)
CHOLEST SERPL-MCNC: 200 MG/DL (ref ?–200)
CO2 SERPL-SCNC: 27 MMOL/L (ref 21–32)
CREAT BLD-MCNC: 1.06 MG/DL
DEPRECATED RDW RBC AUTO: 37.2 FL (ref 35.1–46.3)
EGFRCR SERPLBLD CKD-EPI 2021: 94 ML/MIN/1.73M2 (ref 60–?)
ERYTHROCYTE [DISTWIDTH] IN BLOOD BY AUTOMATED COUNT: 12.5 % (ref 11–15)
EST. AVERAGE GLUCOSE BLD GHB EST-MCNC: 103 MG/DL (ref 68–126)
FASTING PATIENT LIPID ANSWER: NO
FASTING STATUS PATIENT QL REPORTED: NO
GLOBULIN PLAS-MCNC: 3.1 G/DL (ref 2–3.5)
GLUCOSE BLD-MCNC: 96 MG/DL (ref 70–99)
HBA1C MFR BLD: 5.2 % (ref ?–5.7)
HCT VFR BLD AUTO: 44.3 %
HDLC SERPL-MCNC: 61 MG/DL (ref 40–59)
HGB BLD-MCNC: 15.8 G/DL
LDLC SERPL CALC-MCNC: 110 MG/DL (ref ?–100)
MCH RBC QN AUTO: 29.2 PG (ref 26–34)
MCHC RBC AUTO-ENTMCNC: 35.7 G/DL (ref 31–37)
MCV RBC AUTO: 81.9 FL
NONHDLC SERPL-MCNC: 139 MG/DL (ref ?–130)
OSMOLALITY SERPL CALC.SUM OF ELEC: 289 MOSM/KG (ref 275–295)
PLATELET # BLD AUTO: 251 10(3)UL (ref 150–450)
POTASSIUM SERPL-SCNC: 4.6 MMOL/L (ref 3.5–5.1)
PROT SERPL-MCNC: 8.1 G/DL (ref 5.7–8.2)
RBC # BLD AUTO: 5.41 X10(6)UL
SODIUM SERPL-SCNC: 139 MMOL/L (ref 136–145)
TRIGL SERPL-MCNC: 169 MG/DL (ref 30–149)
VLDLC SERPL CALC-MCNC: 29 MG/DL (ref 0–30)
WBC # BLD AUTO: 6.3 X10(3) UL (ref 4–11)

## 2024-09-17 PROCEDURE — 80061 LIPID PANEL: CPT | Performed by: FAMILY MEDICINE

## 2024-09-17 PROCEDURE — 99213 OFFICE O/P EST LOW 20 MIN: CPT | Performed by: FAMILY MEDICINE

## 2024-09-17 PROCEDURE — 83036 HEMOGLOBIN GLYCOSYLATED A1C: CPT | Performed by: FAMILY MEDICINE

## 2024-09-17 PROCEDURE — 85027 COMPLETE CBC AUTOMATED: CPT | Performed by: FAMILY MEDICINE

## 2024-09-17 PROCEDURE — 36415 COLL VENOUS BLD VENIPUNCTURE: CPT | Performed by: FAMILY MEDICINE

## 2024-09-17 PROCEDURE — 80053 COMPREHEN METABOLIC PANEL: CPT | Performed by: FAMILY MEDICINE

## 2024-09-17 NOTE — PROGRESS NOTES
Subjective:   Patient ID: Deonte Esquivel is a 34 year old male.    Pt states has had some questions about hydrocephalus as father was diagnosed with this and has significant deterioration of his condition. No hx of any symptoms a a child. Pt has no sig symptoms.     Pt is interested in doing a vasectomy as he is done with having more children.   Had questions about COVID vaccine also. Pt did get initial series and booster but had sig reaction after 2nd dose. Was wondering about antibody testing. Explained we do not usually do antibody testing as not to helpful in determining immune status for new strains.         History/Other:   Review of Systems  Current Outpatient Medications   Medication Sig Dispense Refill    mesalamine 1000 MG Rectal Suppos Place 1 suppository (1,000 mg total) rectally nightly. 90 suppository 3    Probiotic Product (PROBIOTIC DAILY OR) Take by mouth. Francisco's brand       Allergies:  Allergies   Allergen Reactions    Sulfa Antibiotics UNKNOWN       Objective:   Physical Exam  Constitutional:       Appearance: Normal appearance.   Neurological:      Mental Status: He is alert.   Psychiatric:         Mood and Affect: Mood normal.         Behavior: Behavior normal.         Thought Content: Thought content normal.         Judgment: Judgment normal.         Assessment & Plan:   1. Vasectomy evaluation:  - After discussion, will send to urology. for further evaluation and treatment; To call if any significant symptoms. Also answered questions about COVID vaccine and testing and family hx of hydrocephalus. Can contact neurology if questions and information provided.          No orders of the defined types were placed in this encounter.      Meds This Visit:  Requested Prescriptions      No prescriptions requested or ordered in this encounter       Imaging & Referrals:  UROLOGY - INTERNAL

## 2024-10-16 ENCOUNTER — OFFICE VISIT (OUTPATIENT)
Facility: CLINIC | Age: 34
End: 2024-10-16
Payer: COMMERCIAL

## 2024-10-16 VITALS
HEIGHT: 74.8 IN | HEART RATE: 76 BPM | BODY MASS INDEX: 28.02 KG/M2 | DIASTOLIC BLOOD PRESSURE: 67 MMHG | SYSTOLIC BLOOD PRESSURE: 112 MMHG | WEIGHT: 223 LBS

## 2024-10-16 DIAGNOSIS — K51.20 ULCERATIVE PROCTITIS WITHOUT COMPLICATION (HCC): Primary | ICD-10-CM

## 2024-10-16 PROCEDURE — 99214 OFFICE O/P EST MOD 30 MIN: CPT | Performed by: INTERNAL MEDICINE

## 2024-10-16 RX ORDER — MESALAMINE 1000 MG/1
1000 SUPPOSITORY RECTAL NIGHTLY
Qty: 90 SUPPOSITORY | Refills: 3 | Status: SHIPPED | OUTPATIENT
Start: 2024-10-16

## 2024-10-16 NOTE — PATIENT INSTRUCTIONS
Ulcerative proctitis   -continue on Canasa suppositories  -call if flare up in symptoms   -follow up annually   -avoid NSAIDS  -Flu shot and routine follow up with Dr. Inman

## 2024-10-16 NOTE — PROGRESS NOTES
Deonte Esquivel is a 34 year old male.    HPI:     Chief Complaint   Patient presents with    Follow - Up     Annual check up   Deonte is a 34-year-old male who has a history of ulcerative proctitis and psoriasis who follows up in the office today.    He reports he has been doing well, his psoriasis did flareup recently after an episode of stress but his colitis/proctitis has been under excellent control.  He has been using Canasa suppositories chronically.  He denies any blood per rectum, no change in bowel habits, no abdominal pain.  He does not use NSAIDs.    His last colonoscopy was in January 2023, colon polyp, diverticulosis and internal hemorrhoids, no active inflammation noted in the rectum or colon.    He denies any extraluminal manifestations, no arthritis, no liver disease.  Last labs showed normal liver function test.    HISTORY:  Past Medical History:    Colitis      Past Surgical History:   Procedure Laterality Date    Colonoscopy  08/2013    with Dr Claire in [place]    Colonoscopy N/A 1/6/2023    Procedure: COLONOSCOPY;  Surgeon: Manoj Kaur MD;  Location: [facility]      Family History   Problem Relation Age of Onset    Cancer Maternal Grandfather         colon ca    Heart Disorder Other       Social History:   Social History     Socioeconomic History    Marital status:    Tobacco Use    Smoking status: Never    Smokeless tobacco: Never   Vaping Use    Vaping status: Never Used   Substance and Sexual Activity    Alcohol use: Yes     Comment: soc.    Drug use: No        Medications (Active prior to today's visit):  Current Outpatient Medications   Medication Sig Dispense Refill    mesalamine 1000 MG Rectal Suppos Place 1 suppository (1,000 mg total) rectally nightly. 90 suppository 3    Probiotic Product (PROBIOTIC DAILY OR) Take by mouth. Francisco's brand         Allergies:  Allergies[1]      ROS:   The patient denies any chest pain or shortness of breath,  No neurologic or  Pt presents to the ER via [facility] for blood in stool - occurred once today. Ongoing for the past 2 months. Denies any blood thinner use or any complaints. Had a bloody BM here while triaging pt.    dermatologic symptoms.     PHYSICAL EXAM:   Blood pressure 112/67, pulse 76, height 6' 2.8\" (1.9 m), weight 223 lb (101.2 kg).    The patient appears their stated age and is in no acute distress  HEENT- anicteric sclera, neck no lymphadnopathy, OP- clear with no masses or lesions  Chest- Clear bilaterally, no wheezing,  Heart- regular rate, no murmur or gallop  Abdomen- Soft and nontender, nondistended  Ext- no clubbing or cyanosis  Skin- no rashes or lesions  Neuro- appropriate response, alert, no confusion  .  ASSESSMENT/PLAN:   Assessment     Ulcerative proctitis    The patient will continue on mesalamine/Canasa suppository, they seem to be working well for him.  Discussed the risks of progression, this condition can worsen and involve more of the colon -the patient will call us immediately develops any problems with diarrhea, abdominal pain, rectal bleeding or blood with stool, mucus.  Would advise repeat colonoscopy in the next couple years or so.    Follow-up on an annual basis.       Orders This Visit:  No orders of the defined types were placed in this encounter.      Meds This Visit:  Requested Prescriptions      No prescriptions requested or ordered in this encounter       Imaging & Referrals:  None       Manoj Kaur MD  [facility]              [1]   Allergies  Allergen Reactions    Sulfa Antibiotics UNKNOWN

## 2024-11-25 ENCOUNTER — OFFICE VISIT (OUTPATIENT)
Dept: FAMILY MEDICINE CLINIC | Facility: CLINIC | Age: 34
End: 2024-11-25
Payer: COMMERCIAL

## 2024-11-25 ENCOUNTER — PATIENT MESSAGE (OUTPATIENT)
Dept: FAMILY MEDICINE CLINIC | Facility: CLINIC | Age: 34
End: 2024-11-25

## 2024-11-25 VITALS
HEART RATE: 65 BPM | WEIGHT: 224 LBS | DIASTOLIC BLOOD PRESSURE: 72 MMHG | SYSTOLIC BLOOD PRESSURE: 128 MMHG | RESPIRATION RATE: 16 BRPM | TEMPERATURE: 97 F | BODY MASS INDEX: 29.05 KG/M2 | HEIGHT: 73.8 IN

## 2024-11-25 DIAGNOSIS — Z00.00 ROUTINE PHYSICAL EXAMINATION: Primary | ICD-10-CM

## 2024-11-25 DIAGNOSIS — Z87.19 HISTORY OF ULCERATIVE COLITIS: ICD-10-CM

## 2024-11-25 PROCEDURE — 99395 PREV VISIT EST AGE 18-39: CPT | Performed by: FAMILY MEDICINE

## 2024-11-26 NOTE — TELEPHONE ENCOUNTER
Patient's 10/9/24 flu vaccine is already on his immunization history.   [employer] App message sent to patient to inform.

## 2025-03-26 ENCOUNTER — OFFICE VISIT (OUTPATIENT)
Dept: SURGERY | Facility: CLINIC | Age: 35
End: 2025-03-26
Payer: COMMERCIAL

## 2025-03-26 DIAGNOSIS — Z30.09 FAMILY PLANNING: Primary | ICD-10-CM

## 2025-03-26 PROCEDURE — 99203 OFFICE O/P NEW LOW 30 MIN: CPT | Performed by: UROLOGY

## 2025-03-26 NOTE — PROGRESS NOTES
Adelita Mcelroy MD  Department of Urology  [address] [place] [address] Rd[address]  [place] [address]    T: [phone]  F: [phone]    To: Santi Inman MD   [address] [place] [id number]    Re: Deonte Esquivel   MRN: [id number]  : 3/1/1990    Dear Santi Inman MD,    Today I had the pleasure of seeing Deonte Esquivel in my clinic. As you know, Mr. Esquivel is a pleasant 35 year old year old male who I am seeing for vasectomy consult. Patient was last seen in this department on Visit date not found.    Briefly, patient is  with children and desires vasectomy as a means of contraception.        PAST MEDICAL HISTORY:  Past Medical History:    Colitis        PAST SURGICAL HISTORY:  Past Surgical History:   Procedure Laterality Date    Colonoscopy  2013    with Dr Claire in [place]    Colonoscopy N/A 2023    Procedure: COLONOSCOPY;  Surgeon: Manoj Kaur MD;  Location: [facility]         ALLERGIES:  Allergies[1]      MEDICATIONS:  Current Outpatient Medications   Medication Instructions    mesalamine (CANASA) 1,000 mg, Rectal, Nightly    Probiotic Product (PROBIOTIC DAILY OR) Take by mouth. Francisco's brand        FAMILY HISTORY:  Family History   Problem Relation Age of Onset    Cancer Maternal Grandfather         colon ca    Heart Disorder Other         SOCIAL HISTORY:  Social History     Socioeconomic History    Marital status:    Tobacco Use    Smoking status: Never    Smokeless tobacco: Never   Vaping Use    Vaping status: Never Used   Substance and Sexual Activity    Alcohol use: Yes     Comment: soc.    Drug use: No          PHYSICAL EXAMINATION:  There were no vitals filed for this visit.  CONSTITUTIONAL: No apparent distress, cooperative and communicative  NEUROLOGIC: Alert and oriented   HEAD: Normocephalic, atraumatic   EYES: Sclera non-icteric   ENT: Hearing intact, moist mucous membranes   NECK: No obvious goiter or masses   RESPIRATORY: Normal  respiratory effort, Nonlabored breathing on room air  SKIN: No evident rashes   ABDOMEN: Soft, nontender, nondistended, no rebound tenderness, no guarding, no masses  GENITOURINARY: bilateral vasa palpated   DIGITAL RECTAL EXAM: did not perform    REVIEW OF SYSTEMS:    A comprehensive 10-point review of systems was completed.  Pertinent positives and negatives are noted in the the HPI.       LABORATORY DATA:  None relevant        IMAGING REVIEW:  None relevant     OTHER RELEVANT DATA:   none     IMPRESSION: vasectomy, proceed    We discussed that a vasectomy is intended to be a permanent form of contraception and that if he desires fertility after vasectomy, options included vasectomy reversal and sperm retrieval with possible in vitro fertilization. These options are not always successful and may be very expensive.    We also discussed the risks of vasectomy which included symptomatic hematoma and infection (1-2%), chronic scrotal pain (6%; caused by congestive epididymitis, sperm granuloma and infective epididymoorchitis) need for repeat vasectomy (<1% of cases), need for additional procedures, vasectomy failure, and pregnancy (1 in 2000 men). The chronic scrotal discomfort may be no more than a low-grade chronic ache that causes little disability and requires only symptomatic treatment. However, a proportion of patients will be sufficiently debilitated to seek epididymectomy or even orchidectomy (removal of the epididymis and or testicle). Furthermore, for a very small number of patients, even this radical surgery will not provide relief from their scrotal discomfort.     We discussed that he will have two small incisions in his scrotum with dissolvable sutures. He was told that vasectomy does NOT produce immediate sterility and that following vasectomy, another form of contraception is required until vas occlusion is confirmed by post vasectomy semen analysis. Post vasectomy semenanalysis will be obtained 12-16  weeks after the vasectomy. He was told that he may stop using other methods of contraception when examination of one well-mixed, uncentrifuged, fresh post-vasectomy semen specimen shows azoospermia or only rare non-motile sperm. He was once again told that even after vas occlusion is confirmed, vasectomy is not 100% reliable in preventing pregnancy and that the risk of pregnancy after vasectomy is approximately 1 in 2,000 men who have post-vasectomy azoospermia or semenanalysis showing rare non-motile sperm. The reasons are very early recanalization of the vas deferens or the presence of an accessory vas unrecognized at the time of surgery. There have also been cases of DNA-confirmed paternity despite documented azoospermia before and after conception. He was told to refrain from ejaculation for approximately one week after vasectomy. If there are >100,000 non-motile sperm/mL after 6 months post vasectomy, we would trend the semenanalysis for consideration of repeat vasectomy.       PLAN:  vasectomy    Thank you for referring this very pleasant patient to my clinic. If you have any questions or concerns, please do not hesitate to contact me.    Sincerely,  Adelita Mcelroy MD    30 minutes were spent on this patient at this visit obtaining a history, reviewing medical records, developing a treatment plan, counseling and discussing treatment strategy with patient, coordination of care and documentation.     The 21st Century Cures Act makes medical notes available to patients in the interest of transparency.  However, please be advised that this is a medical document.  It is intended as a peer to peer communication.  It is written in medical language and may contain abbreviations or verbiage that are technical and unfamiliar.  It may appear blunt or direct.  Medical documents are intended to carry relevant information, facts as evident, and the clinical opinion of the practitioner.       [1]   Allergies  Allergen  Reactions    Sulfa Antibiotics UNKNOWN

## 2025-05-19 ENCOUNTER — PATIENT MESSAGE (OUTPATIENT)
Facility: CLINIC | Age: 35
End: 2025-05-19

## 2025-05-19 ENCOUNTER — PATIENT MESSAGE (OUTPATIENT)
Dept: FAMILY MEDICINE CLINIC | Facility: CLINIC | Age: 35
End: 2025-05-19

## 2025-05-19 DIAGNOSIS — D72.819 LEUKOPENIA, UNSPECIFIED TYPE: Primary | ICD-10-CM

## 2025-05-20 NOTE — TELEPHONE ENCOUNTER
Testing from [email or web address] received and reviewed.  CMP, CBC, and LIPID Panel unremarkable except sl low WBC: Pt contacted through [employer].

## 2025-05-21 NOTE — TELEPHONE ENCOUNTER
Amanda sent .     Dr Inman=See patient's question and RN's response. Any additional information? Thanks.    Please respond directly to the patient if no additional staff support is required.    Future Appointments   Date Time Provider Department Center   10/8/2025  3:00 PM [facility]   12/1/2025  2:40 PM Santi Inman MD [facility]         LABS HISTORY :  5/12/2025 (MEDIA scanned 5/19/25) WBC===3.7   9/17/2024 WBC ==6.3  1/24/2020   WBC ==3.6

## (undated) DEVICE — SNARE ENDOSCOPIC 10MM ROUND

## (undated) DEVICE — KIT CLEAN ENDOKIT 1.1OZ GOWNX2

## (undated) DEVICE — MEDI-VAC NON-CONDUCTIVE SUCTION TUBING 6MM X 1.8M (6FT.) L: Brand: CARDINAL HEALTH

## (undated) DEVICE — SNARE OPTMZ PLPCTM TRP

## (undated) DEVICE — Device: Brand: DUAL NARE NASAL CANNULAE FEMALE LUER CON 7FT O2 TUBE

## (undated) DEVICE — FORCEP RADIAL JAW 4

## (undated) DEVICE — KIT ENDO ORCAPOD 160/180/190

## (undated) DEVICE — 60 ML SYRINGE REGULAR TIP: Brand: MONOJECT

## (undated) NOTE — LETTER
12/28/18        [place] [address] [address] [address]      Dear Mariam Vasquez records indicate that you have outstanding lab work and or testing that was ordered for you and has not yet been completed:    Sed Rate, Immunoglobulin, Ti

## (undated) NOTE — LETTER
2/27/2023              [facility]        [address] [id number]         Dear Iris Choudhary,    [facility] records indicate that the tests ordered for you by Katelyn Garza. Krystle Kitchen MD  have not been done. COMP METABOLIC PANEL (14) (Order #[id number]) on 10/19/22  CBC WITH DIFFERENTIAL WITH PLATELET (Order #[id number]) on 10/19/22   If you have, in fact, already completed the tests or you do not wish to have the tests done, please contact our office at [address] [place] [address]. Otherwise, please proceed with the testing. Sincerely,    Katelyn Garza.  Krystle Kitchen MD  [facility], [address] [id number] [phone]